# Patient Record
Sex: MALE | Race: WHITE | Employment: FULL TIME | URBAN - METROPOLITAN AREA
[De-identification: names, ages, dates, MRNs, and addresses within clinical notes are randomized per-mention and may not be internally consistent; named-entity substitution may affect disease eponyms.]

---

## 2022-02-14 ENCOUNTER — OFFICE VISIT (OUTPATIENT)
Dept: NEUROLOGY | Age: 47
End: 2022-02-14
Payer: COMMERCIAL

## 2022-02-14 VITALS
HEART RATE: 67 BPM | DIASTOLIC BLOOD PRESSURE: 85 MMHG | HEIGHT: 68 IN | WEIGHT: 175 LBS | SYSTOLIC BLOOD PRESSURE: 137 MMHG | OXYGEN SATURATION: 97 % | RESPIRATION RATE: 14 BRPM | TEMPERATURE: 97.8 F | BODY MASS INDEX: 26.52 KG/M2

## 2022-02-14 DIAGNOSIS — H53.8 BLURRED VISION: ICD-10-CM

## 2022-02-14 DIAGNOSIS — R29.898 WEAKNESS OF BOTH LEGS: ICD-10-CM

## 2022-02-14 DIAGNOSIS — H53.10 VISUAL DISTURBANCE, SUBJECTIVE: Primary | ICD-10-CM

## 2022-02-14 PROCEDURE — 99204 OFFICE O/P NEW MOD 45 MIN: CPT | Performed by: PSYCHIATRY & NEUROLOGY

## 2022-02-14 RX ORDER — NAPROXEN SODIUM 220 MG
440 TABLET ORAL AS NEEDED
COMMUNITY

## 2022-02-14 NOTE — PATIENT INSTRUCTIONS
RESULT POLICY      If we have ordered testing for you, know that; \"NO NEWS IS GOOD NEWS! \"     It is our policy that we no longer call patients with results, nor do we  give test results over the phone. We schedule follow up appointments so that your results can be discussed in person. This allows you to address any questions you have regarding the results. If you choose to go to an imaging center outside of [facility], it is your responsibility to bring imaging report and disc to follow up appointment. If something of concern is revealed on your test, we will contact you to discuss the matter and if needed schedule a sooner follow up appointment. Additionally, results may be found by using the My Chart feature and one of our patient service representatives at the  can give you instructions on how to access this feature to utilize our electronic medical record system. Thank you for your understanding. [facility]   Statement to Patients  April 1, 2014      In an effort to ensure the large volume of patient prescription refills is processed in the most efficient and expeditious manner, we are asking our patients to assist us by calling your Pharmacy for all prescription refills, this will include also your  Mail Order Pharmacy. The pharmacy will contact our office electronically to continue the refill process. Please do not wait until the last minute to call your pharmacy. We need at least 48 hours (2days) to fill prescriptions. We also encourage you to call your pharmacy before going to  your prescription to make sure it is ready. With regard to controlled substance prescription refill requests (narcotic refills) that need to be picked up at our office, we ask your cooperation by providing us with at least 72 hours (3days) notice that you will need a refill.     We will not refill narcotic prescription refill requests after 4:00pm on any weekday, Monday through Thursday, or after 2:00pm on Fridays, or on the weekends. We encourage everyone to explore another way of getting your prescription refill request processed using [employer], our patient web portal through our electronic medical record system. [employer] is an efficient and effective way to communicate your medication request directly to the office and  downloadable as an kendra on your smart phone . [employer] also features a review functionality that allows you to view your medication list as well as leave messages for your physician. Are you ready to get connected? If so please review the attatched instructions or speak to any of our staff to get you set up right away! Thank you so much for your cooperation. Should you have any questions please contact our Practice Administrator.
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
## 2022-02-14 NOTE — PROGRESS NOTES
[facility] and [address] at [facility] at [address] [facility], [address] [facility] [address] [facility], [address] [place] [address]  [phone] Office  [phone]           Referring: MD Zeyad Tidwell [address] [place] [address],  [address]    Chief Complaint   Patient presents with    New Patient    Neurologic Problem     eval for MS.  episodes of blurred vision, like kaleidosope, muscle fatigue in legs, hot days are very taxing   60-year-old gentleman who presents today for initial neurologic consultation regarding a constellation of symptoms question demyelinating disease. He notes that he has had the symptoms ongoing for about the last 6 years but they have been getting progressively worse. Happening more frequently. He notes that he gets cramping in his legs right greater than left. He has a overall sense of general fatigue but then he has times where both of his legs just feel like they are very weak. No focal weakness in terms of having to drag leg around. He also has visual disturbance where he will have seconds to minutes of just visual obscuration where his vision is blurred. He sometimes gets a kaleidoscope effect in his eyes lasting for a minute or so and then it goes away. He has never gone blind. No visual field cut. He notes that the symptoms in the legs started about 6 years ago and would happen sporadically. The visual symptoms started about a year ago and were again more sporadic now he is having them several times a week. He saw his primary physician who ordered blood work including glucose studies and thyroid function and all that was normal.  He is a healthy zaire otherwise. He had a hernia repair and a shoulder arthroscopy and other than that he does not go to the doctor. 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   His father has MS diagnosed in the mid [de-identified] when he was about 40years of age. Patient has not had any imaging. He did have a thorough eye examination and everything was fine. Past Medical History:   Diagnosis Date    Migraine        Past Surgical History:   Procedure Laterality Date    HX HERNIA REPAIR Right     HX SHOULDER ARTHROSCOPY Left        Current Outpatient Medications   Medication Sig Dispense Refill    naproxen sodium (Aleve) 220 mg tablet Take 440 mg by mouth as needed for PRN Reason (Other) (headache). No Known Allergies    Social History     Tobacco Use    Smoking status: Never Smoker    Smokeless tobacco: Never Used   Vaping Use    Vaping Use: Never used   Substance Use Topics    Alcohol use: Yes     Comment: occasional    Drug use: Not Currently       Family History   Problem Relation Age of Onset    Hypertension Mother     Mult Sclerosis Father        Review of Systems  Pertinent positives and negatives as noted. Examination  Visit Vitals  BP (!) 160/94 (BP 1 Location: Left upper arm, BP Patient Position: Sitting, BP Cuff Size: Large adult)   Pulse 67   Temp 97.8 °F (36.6 °C)   Resp 14   Ht 5' 8\" (1.727 m)   Wt 79.4 kg (175 lb)   SpO2 97%   BMI 26.61 kg/m²     Neurologically, he is awake, alert, oriented with normal speech, language, and cognition. Cranial nerves intact 2-12. He has normal bulk and tone. There is no abnormal movement. There is no pronation or drift. He is able to generate full strength in the upper and lower extremities and all muscle groups to direct confrontational testing. Reflexes are symmetrical in the upper and lower extremities bilaterally. No pathologic reflexes are elicited. He has no ataxia or past pointing.      Impression/Plan  70-year-old gentleman with visual disturbance, leg weakness intermittently as stated above question demyelinating disease versus vascular versus ictal versus other  MRI of the brain  EEG  Carotid Doppler  Follow-up after testing    Ami Sawyer MD          This note was created using voice recognition software. Despite editing, there may be syntax errors.

## 2022-02-24 ENCOUNTER — OFFICE VISIT (OUTPATIENT)
Dept: NEUROLOGY | Age: 47
End: 2022-02-24

## 2022-02-24 DIAGNOSIS — R29.818 TRANSIENT NEUROLOGICAL SYMPTOMS: Primary | ICD-10-CM

## 2022-03-01 ENCOUNTER — HOSPITAL ENCOUNTER (OUTPATIENT)
Dept: MRI IMAGING | Age: 47
Discharge: HOME | End: 2022-03-01
Attending: PSYCHIATRY & NEUROLOGY
Payer: COMMERCIAL

## 2022-03-01 DIAGNOSIS — R29.898 WEAKNESS OF BOTH LEGS: ICD-10-CM

## 2022-03-01 DIAGNOSIS — H53.8 BLURRED VISION: ICD-10-CM

## 2022-03-01 DIAGNOSIS — H53.10 VISUAL DISTURBANCE, SUBJECTIVE: ICD-10-CM

## 2022-03-01 PROCEDURE — A9576 INJ PROHANCE MULTIPACK: HCPCS | Performed by: PSYCHIATRY & NEUROLOGY

## 2022-03-01 PROCEDURE — 70553 MRI BRAIN STEM W/O & W/DYE: CPT

## 2022-03-01 PROCEDURE — 74011250636 HC RX REV CODE- 250/636: Performed by: PSYCHIATRY & NEUROLOGY

## 2022-03-01 RX ADMIN — GADOTERIDOL 20 ML: 279.3 INJECTION, SOLUTION INTRAVENOUS at 16:33

## 2022-03-24 ENCOUNTER — VIRTUAL VISIT (OUTPATIENT)
Dept: NEUROLOGY | Age: 47
End: 2022-03-24
Payer: COMMERCIAL

## 2022-03-24 DIAGNOSIS — R25.2 NOCTURNAL MUSCLE CRAMP: ICD-10-CM

## 2022-03-24 DIAGNOSIS — R53.83 FATIGUE, UNSPECIFIED TYPE: ICD-10-CM

## 2022-03-24 DIAGNOSIS — G31.84 MILD COGNITIVE IMPAIRMENT: ICD-10-CM

## 2022-03-24 DIAGNOSIS — H53.10 VISUAL DISTURBANCE, SUBJECTIVE: Primary | ICD-10-CM

## 2022-03-24 PROCEDURE — 99214 OFFICE O/P EST MOD 30 MIN: CPT | Performed by: NURSE PRACTITIONER

## 2022-03-24 NOTE — PROGRESS NOTES
Kameron Villalpando is a 55 y.o. male who was seen by synchronous (real-time) audio-video technology on 3/24/2022 for Results        Assessment & Plan:   Diagnoses and all orders for this visit:    1. Visual disturbance, subjective  -     FELICE, DIRECT, W/REFLEX; Future  -     VITAMIN D, 25 HYDROXY; Future  -     MAGNESIUM; Future    2. Nocturnal muscle cramp  -     FELICE, DIRECT, W/REFLEX; Future  -     VITAMIN D, 25 HYDROXY; Future  -     MAGNESIUM; Future    3. Fatigue, unspecified type  -     FELICE, DIRECT, W/REFLEX; Future  -     VITAMIN D, 25 HYDROXY; Future  -     MAGNESIUM; Future    4. Mild cognitive impairment  -     FELICE, DIRECT, W/REFLEX; Future  -     VITAMIN D, 25 HYDROXY; Future  -     MAGNESIUM; Future      Based on his MRI which was personally reviewed by myself and Dr. Gumaro Suresh, it does not appear that this is a case of multiple sclerosis. He does have similar symptoms that one might see in a relapsing remitting MS picture but there are mimickers that may not have been completely ruled out. Some of them such as thyroid dysfunction and B12 deficiency have been ruled out as a potential etiology. Other autoimmune issues and metabolic derangements have not however. Recommend at this point that we try to rule out completely any MS mimickers. Given the nocturnal leg cramps, we will send him for a magnesium level as well as vitamin D level. We will check an FELICE for other autoimmune diseases. Subjective: Follow-up after testing. This is a nice 59-year-old gentleman who presented initially with visual disturbance described as blurred vision and a kaleidoscope type picture in his visual field. He indicates that that lasted few seconds. He has had some mild cognitive impairment and that he would have some difficulty with word finding being forgetful, and losing his train of thought.   He has had increased bouts of fatigue in which he will feel very tired for a couple of days and then may go a couple of weeks feeling pretty well. He has also been having a lot of leg cramping especially at night. This will wake him up. He has also apparently been getting some random bruising some of which has been pretty severe. Again, he did see his primary care physician who did thyroid function testing and B12 which was normal.  When he saw Dr. Yuko Delgado, he was sent for MRI of the brain which was normal.  He also had a carotid Doppler which was normal.  EEG is not been read. Recap from [address] [place] [address]:  42-year-old gentleman with visual disturbance, leg weakness intermittently as stated above question demyelinating disease versus vascular versus ictal versus other  MRI of the brain  EEG  Carotid Doppler  Follow-up after testing      Prior to Admission medications    Medication Sig Start Date End Date Taking? Authorizing Provider   naproxen sodium (Aleve) 220 mg tablet Take 440 mg by mouth as needed for PRN Reason (Other) (headache). Provider, Historical         ROS    Objective:   No flowsheet data found.      [INSTRUCTIONS:  \"[x]\" Indicates a positive item  \"[]\" Indicates a negative item  -- DELETE ALL ITEMS NOT EXAMINED]    Constitutional: [x] Appears well-developed and well-nourished [x] No apparent distress      [] Abnormal -     Mental status: [x] Alert and awake  [x] Oriented to person/place/time [x] Able to follow commands    [] Abnormal -     Eyes:   EOM    [x]  Normal    [] Abnormal -   Sclera  [x]  Normal    [] Abnormal -          Discharge [x]  None visible   [] Abnormal -     HENT: [x] Normocephalic, atraumatic  [] Abnormal -   [x] Mouth/Throat: Mucous membranes are moist    External Ears [x] Normal  [] Abnormal -    Neck: [x] No visualized mass [] Abnormal -     Pulmonary/Chest: [x] Respiratory effort normal   [x] No visualized signs of difficulty breathing or respiratory distress        [] Abnormal -      Musculoskeletal:   [x] Normal gait with no signs of ataxia         [x] Normal range of motion of neck        [] Abnormal -     Neurological:        [x] No Facial Asymmetry (Cranial nerve 7 motor function) (limited exam due to video visit)          [x] No gaze palsy        [] Abnormal -          Skin:        [x] No significant exanthematous lesions or discoloration noted on facial skin         [] Abnormal -            Psychiatric:       [x] Normal Affect [] Abnormal -        [x] No Hallucinations    Other pertinent observable physical exam findings:-        We discussed the expected course, resolution and complications of the diagnosis(es) in detail. Medication risks, benefits, costs, interactions, and alternatives were discussed as indicated. I advised him to contact the office if his condition worsens, changes or fails to improve as anticipated. He expressed understanding with the diagnosis(es) and plan. Geoffreyemily De La Ohardeep, was evaluated through a synchronous (real-time) audio-video encounter. The patient (or guardian if applicable) is aware that this is a billable service, which includes applicable co-pays. Verbal consent to proceed has been obtained. The visit was conducted pursuant to the emergency declaration under the [address] [place] [address], [address] [place] [address] authority and the [employer] and [employer] General Act. Patient identification was verified, and a caregiver was present when appropriate. The patient was located at home in a state where the provider was licensed to provide care.       Catrina Mathur NP

## 2022-03-27 NOTE — PROCEDURES
EEG:      Date:  02/24/22    Requesting Physician:  Christ Sandhoff, MD     An EEG is requested in this 24-year-old man to evaluate for epileptiform abnormality. Medications:  Medications are listed as Naprosyn. This tracing is obtained during the awake state. During wakefulness there are intermittent runs of posteriorly dominant and symmetric low to medium amplitude 11 cycle per second activities, which attenuate with eye opening. Lower voltage, faster frequency activities are seen symmetrically over the anterior head regions. Hyperventilation is not performed. Intermittent photic stimulation induces symmetric posterior driving responses. Sleep is not attained. Interpretation:   This EEG recorded during the awake state is normal.

## 2024-02-20 ENCOUNTER — HOSPITAL ENCOUNTER (OUTPATIENT)
Facility: HOSPITAL | Age: 49
Discharge: HOME | End: 2024-02-23
Payer: COMMERCIAL

## 2024-02-20 DIAGNOSIS — R10.9 ABDOMINAL PAIN, UNSPECIFIED ABDOMINAL LOCATION: ICD-10-CM

## 2024-02-20 PROCEDURE — 76700 US EXAM ABDOM COMPLETE: CPT
